# Patient Record
Sex: FEMALE | URBAN - METROPOLITAN AREA
[De-identification: names, ages, dates, MRNs, and addresses within clinical notes are randomized per-mention and may not be internally consistent; named-entity substitution may affect disease eponyms.]

---

## 2024-03-13 ENCOUNTER — LAB (OUTPATIENT)
Dept: URBAN - METROPOLITAN AREA CLINIC 111 | Facility: CLINIC | Age: 29
End: 2024-03-13

## 2024-03-13 ENCOUNTER — OV NP (OUTPATIENT)
Dept: URBAN - METROPOLITAN AREA CLINIC 111 | Facility: CLINIC | Age: 29
End: 2024-03-13
Payer: COMMERCIAL

## 2024-03-13 VITALS
BODY MASS INDEX: 23.45 KG/M2 | SYSTOLIC BLOOD PRESSURE: 117 MMHG | TEMPERATURE: 98.1 F | HEIGHT: 62 IN | WEIGHT: 127.4 LBS | DIASTOLIC BLOOD PRESSURE: 77 MMHG | HEART RATE: 92 BPM

## 2024-03-13 DIAGNOSIS — R93.3 ABNORMAL BARIUM SWALLOW: ICD-10-CM

## 2024-03-13 DIAGNOSIS — R13.19 DYSPHAGIA: ICD-10-CM

## 2024-03-13 PROCEDURE — 99204 OFFICE O/P NEW MOD 45 MIN: CPT | Performed by: PHYSICIAN ASSISTANT

## 2024-03-13 PROCEDURE — 99244 OFF/OP CNSLTJ NEW/EST MOD 40: CPT | Performed by: PHYSICIAN ASSISTANT

## 2024-03-13 NOTE — HPI-TODAY'S VISIT:
27 y/o female here with dysphagia. This patient was referred by Dr. Rosalee Cevallos. A copy of this will be sent to the referring provider.  Pt had a barium swallow 3 weeks ago revealing the tablet just sat in her esophagus. She was told she may have a pocket or a web. No previous EGD. Barium Swallow 2/19 revealing transient stasis of barium tablet at upper/mid esophagus just below the clavicles. Eventually passed after multiple sips of water and thin barium. Partial stricture or other obstruction in esophagus not excluded.  She states she got covid in December and has been on Pantoprazole and Famotidine with no relief. She has also been taking an allergy pill. She could not swallow liquids for 3 days while she had covid. No h/o dysphagia before that. She has symptoms weekly now with solids. She has also noticed hives and itching in general which has been improving with allergy medicine. No burning. No h/o reflux. She is vomiting after food is getting stuck. She has tried to cut out diary or gluten but nothing has helped. Waking up at night with coughing. She has been taking PPI 40 mg daily and Famotidine 20 mg BID. She has also taken Pepto and Tums.   No significant weight loss. No FH of GI cancer. Reports some constipation recently. No blood in the stool. No abd pain.

## 2024-03-20 ENCOUNTER — EGD (OUTPATIENT)
Dept: URBAN - METROPOLITAN AREA LAB 3 | Facility: LAB | Age: 29
End: 2024-03-20

## 2024-03-20 ENCOUNTER — LAB (OUTPATIENT)
Dept: URBAN - METROPOLITAN AREA CLINIC 23 | Facility: CLINIC | Age: 29
End: 2024-03-20

## 2024-03-28 ENCOUNTER — OV EP (OUTPATIENT)
Dept: URBAN - METROPOLITAN AREA CLINIC 111 | Facility: CLINIC | Age: 29
End: 2024-03-28